# Patient Record
Sex: FEMALE | Race: OTHER | NOT HISPANIC OR LATINO
[De-identification: names, ages, dates, MRNs, and addresses within clinical notes are randomized per-mention and may not be internally consistent; named-entity substitution may affect disease eponyms.]

---

## 2017-07-19 ENCOUNTER — APPOINTMENT (OUTPATIENT)
Dept: MAMMOGRAPHY | Facility: IMAGING CENTER | Age: 42
End: 2017-07-19

## 2017-07-19 ENCOUNTER — OUTPATIENT (OUTPATIENT)
Dept: OUTPATIENT SERVICES | Facility: HOSPITAL | Age: 42
LOS: 1 days | End: 2017-07-19
Payer: COMMERCIAL

## 2017-07-19 ENCOUNTER — APPOINTMENT (OUTPATIENT)
Dept: ULTRASOUND IMAGING | Facility: IMAGING CENTER | Age: 42
End: 2017-07-19

## 2017-07-19 DIAGNOSIS — R22.9 LOCALIZED SWELLING, MASS AND LUMP, UNSPECIFIED: Chronic | ICD-10-CM

## 2017-07-19 DIAGNOSIS — Z00.00 ENCOUNTER FOR GENERAL ADULT MEDICAL EXAMINATION WITHOUT ABNORMAL FINDINGS: ICD-10-CM

## 2017-07-19 PROCEDURE — 76641 ULTRASOUND BREAST COMPLETE: CPT

## 2017-07-19 PROCEDURE — 77067 SCR MAMMO BI INCL CAD: CPT

## 2017-07-19 PROCEDURE — 77063 BREAST TOMOSYNTHESIS BI: CPT

## 2017-07-21 ENCOUNTER — TRANSCRIPTION ENCOUNTER (OUTPATIENT)
Age: 42
End: 2017-07-21

## 2017-08-15 ENCOUNTER — EMERGENCY (EMERGENCY)
Facility: HOSPITAL | Age: 42
LOS: 0 days | Discharge: HOME | End: 2017-08-15
Attending: EMERGENCY MEDICINE
Payer: COMMERCIAL

## 2017-08-15 VITALS
RESPIRATION RATE: 16 BRPM | HEIGHT: 63 IN | SYSTOLIC BLOOD PRESSURE: 114 MMHG | HEART RATE: 68 BPM | WEIGHT: 149.91 LBS | DIASTOLIC BLOOD PRESSURE: 85 MMHG | OXYGEN SATURATION: 96 % | TEMPERATURE: 98 F

## 2017-08-15 DIAGNOSIS — R22.9 LOCALIZED SWELLING, MASS AND LUMP, UNSPECIFIED: Chronic | ICD-10-CM

## 2017-08-15 PROCEDURE — 99283 EMERGENCY DEPT VISIT LOW MDM: CPT | Mod: 25

## 2017-08-15 PROCEDURE — 12001 RPR S/N/AX/GEN/TRNK 2.5CM/<: CPT

## 2017-08-15 RX ORDER — IBUPROFEN 200 MG
600 TABLET ORAL ONCE
Qty: 0 | Refills: 0 | Status: COMPLETED | OUTPATIENT
Start: 2017-08-15 | End: 2017-08-15

## 2017-08-15 RX ORDER — TETANUS TOXOID, REDUCED DIPHTHERIA TOXOID AND ACELLULAR PERTUSSIS VACCINE, ADSORBED 5; 2.5; 8; 8; 2.5 [IU]/.5ML; [IU]/.5ML; UG/.5ML; UG/.5ML; UG/.5ML
0.5 SUSPENSION INTRAMUSCULAR ONCE
Qty: 0 | Refills: 0 | Status: COMPLETED | OUTPATIENT
Start: 2017-08-15 | End: 2017-08-15

## 2017-08-15 RX ORDER — BACITRACIN ZINC 500 UNIT/G
1 OINTMENT IN PACKET (EA) TOPICAL ONCE
Qty: 0 | Refills: 0 | Status: COMPLETED | OUTPATIENT
Start: 2017-08-15 | End: 2017-08-15

## 2017-08-15 RX ADMIN — TETANUS TOXOID, REDUCED DIPHTHERIA TOXOID AND ACELLULAR PERTUSSIS VACCINE, ADSORBED 0.5 MILLILITER(S): 5; 2.5; 8; 8; 2.5 SUSPENSION INTRAMUSCULAR at 08:13

## 2017-08-15 RX ADMIN — Medication 1 APPLICATION(S): at 08:53

## 2017-08-15 RX ADMIN — Medication 600 MILLIGRAM(S): at 08:00

## 2017-08-15 NOTE — ED ADULT NURSE NOTE - PMH
Allergy  seasonal  Allergy  shellfish  Arthropathy  April 2014,  b/l 5th digit of feet  Malformation  "venous malformation of the brain" --- patient states it "is not chiari"  Post concussion syndrome  2011 s/p fall in bathtub-- residual of intermittent headaches. Followed by her pcp after cleared by a neurologist

## 2017-08-15 NOTE — ED PROVIDER NOTE - PROGRESS NOTE DETAILS
Pt. reports feeling better after meds and lac repair; simple lac repair, no fb, no bone visible, no longer bleeding  pt. agrees to f/u with primary care outpt. in 1 wk for suture removal  pt. understands to return to ED if symptoms worsen; will d/c

## 2017-08-15 NOTE — ED PROVIDER NOTE - OBJECTIVE STATEMENT
41 yo F with R thumb laceration while using a kitchen knife this morning.  Cut was accidental.  Pt. has no other complaints and denies other trauma.  Happened about 30 minutes ago.  Still bleeding.  No other complaints.   ROS: negative for fever, cough, headache, chest pain, shortness of breath, abd pain, nausea, vomiting, diarrhea, rash, paresthesia, and weakness.   PMH: 2013  right breast lumpectomy -- benign, venous malformation brain; Meds: Denies; SH: Denies smoking/drinking/drug use

## 2017-08-15 NOTE — ED PROVIDER NOTE - PHYSICAL EXAMINATION
Vitals: WNL  Gen: AAOx3, NAD, sitting comfortably in stretcher  Head: ncat, perrla, eomi b/l  Neck: supple, no lymphadenopathy, no midline deviation  Heart: rrr, no m/r/g  Lungs: CTA b/l, no rales/ronchi/wheezes  Abd: soft, nontender, non-distended, no rebound or guarding  Ext: no clubbing/cyanosis/edema, 2 cm laceration to R volar thumb, small bleeding, no foreign bodies, normal sensation in tip of thumb.  Lac located over dip, about 2 cm from finger tip, no bone visible  Neuro: sensation and muscle strength intact b/l, steady gait

## 2017-08-15 NOTE — ED PROVIDER NOTE - CARE PLAN
Principal Discharge DX:	Laceration of right thumb without foreign body without damage to nail, initial encounter

## 2017-08-16 DIAGNOSIS — Z91.013 ALLERGY TO SEAFOOD: ICD-10-CM
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
## 2017-08-16 DIAGNOSIS — S61.011A LACERATION WITHOUT FOREIGN BODY OF RIGHT THUMB WITHOUT DAMAGE TO NAIL, INITIAL ENCOUNTER: ICD-10-CM
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
## 2017-08-24 ENCOUNTER — OUTPATIENT (OUTPATIENT)
Dept: OUTPATIENT SERVICES | Facility: HOSPITAL | Age: 42
LOS: 1 days | End: 2017-08-24

## 2017-08-24 VITALS
WEIGHT: 160.06 LBS | TEMPERATURE: 98 F | SYSTOLIC BLOOD PRESSURE: 124 MMHG | RESPIRATION RATE: 16 BRPM | HEIGHT: 63 IN | HEART RATE: 68 BPM | DIASTOLIC BLOOD PRESSURE: 82 MMHG

## 2017-08-24 DIAGNOSIS — Z91.013 ALLERGY TO SEAFOOD: ICD-10-CM

## 2017-08-24 DIAGNOSIS — N92.5 OTHER SPECIFIED IRREGULAR MENSTRUATION: ICD-10-CM

## 2017-08-24 DIAGNOSIS — F07.81 POSTCONCUSSIONAL SYNDROME: ICD-10-CM

## 2017-08-24 DIAGNOSIS — R22.9 LOCALIZED SWELLING, MASS AND LUMP, UNSPECIFIED: Chronic | ICD-10-CM

## 2017-08-24 DIAGNOSIS — Z98.890 OTHER SPECIFIED POSTPROCEDURAL STATES: Chronic | ICD-10-CM

## 2017-08-24 DIAGNOSIS — Q89.9 CONGENITAL MALFORMATION, UNSPECIFIED: ICD-10-CM

## 2017-08-24 LAB
HCT VFR BLD CALC: 37.4 % — SIGNIFICANT CHANGE UP (ref 34.5–45)
HGB BLD-MCNC: 11.8 G/DL — SIGNIFICANT CHANGE UP (ref 11.5–15.5)
MCHC RBC-ENTMCNC: 25.7 PG — LOW (ref 27–34)
MCHC RBC-ENTMCNC: 31.6 % — LOW (ref 32–36)
MCV RBC AUTO: 81.5 FL — SIGNIFICANT CHANGE UP (ref 80–100)
NRBC # FLD: 0 — SIGNIFICANT CHANGE UP
PLATELET # BLD AUTO: 317 K/UL — SIGNIFICANT CHANGE UP (ref 150–400)
PMV BLD: 11.8 FL — SIGNIFICANT CHANGE UP (ref 7–13)
RBC # BLD: 4.59 M/UL — SIGNIFICANT CHANGE UP (ref 3.8–5.2)
RBC # FLD: 18.9 % — HIGH (ref 10.3–14.5)
WBC # BLD: 7.87 K/UL — SIGNIFICANT CHANGE UP (ref 3.8–10.5)
WBC # FLD AUTO: 7.87 K/UL — SIGNIFICANT CHANGE UP (ref 3.8–10.5)

## 2017-08-24 RX ORDER — SODIUM CHLORIDE 9 MG/ML
1000 INJECTION, SOLUTION INTRAVENOUS
Qty: 0 | Refills: 0 | Status: DISCONTINUED | OUTPATIENT
Start: 2017-09-06 | End: 2017-09-06

## 2017-08-24 NOTE — H&P PST ADULT - RS GEN PE MLT RESP DETAILS PC
no wheezes/no rhonchi/no rales/clear to auscultation bilaterally good air movement/no wheezes/no intercostal retractions/no rales/respirations non-labored/airway patent/no subcutaneous emphysema/breath sounds equal/no rhonchi/no chest wall tenderness/clear to auscultation bilaterally

## 2017-08-24 NOTE — H&P PST ADULT - HISTORY OF PRESENT ILLNESS
42 year old female G0 presents to presurgical testing with diagnosis of other specified irregular menstruation scheduled for hysteroscopy endometrial ablation for 9/6/17. Pt reports heavy menstrual bleeding for years, lasting for about 3 days, associated with iron deficiency anemia. Denies pelvic pain but has cramping with menses. Sonogram done and referred for surgical intervention.

## 2017-08-24 NOTE — H&P PST ADULT - NEGATIVE ENMT SYMPTOMS
no nasal discharge/no dysphagia/no ear pain/no nasal obstruction/no tinnitus/no sinus symptoms/no vertigo/no throat pain/no post-nasal discharge/no hearing difficulty

## 2017-08-24 NOTE — H&P PST ADULT - PROBLEM SELECTOR PLAN 1
Pt is scheduled for hysteroscopy endometrial ablation for 9/6/17. Preop instructions and Pepcid provided. Pt stated understanding.

## 2017-08-24 NOTE — H&P PST ADULT - PSH
H/O arthroplasty  b/l fifth digit of feet 4/2014  H/O nasal septoplasty  2015  Mass  2013  right breast lumpectomy -- benign

## 2017-08-24 NOTE — H&P PST ADULT - NEGATIVE OPHTHALMOLOGIC SYMPTOMS
wears glasses or contacts/no diplopia/no pain L/no loss of vision R/no discharge L/no loss of vision L/no blurred vision L/no blurred vision R/no discharge R/no photophobia

## 2017-08-24 NOTE — H&P PST ADULT - NEUROLOGICAL COMMENTS
2011 fell in bath tub with subsequent concussion-- 2012 diagnosed with post concussion syndrome with intermittent headaches, managed on Wellbutrin

## 2017-08-24 NOTE — H&P PST ADULT - LYMPHATIC
supraclavicular R/posterior cervical R/posterior cervical L/supraclavicular L/anterior cervical R/anterior cervical L

## 2017-08-24 NOTE — H&P PST ADULT - VISION (WITH CORRECTIVE LENSES IF THE PATIENT USUALLY WEARS THEM):
Normal vision: sees adequately in most situations; can see medication labels, newsprint/glasses / contacts

## 2017-08-24 NOTE — H&P PST ADULT - [facility] PE MLT D E PC
regular rate and rhythm/S 1  S 2/no murmur/no rub no rub/no murmur/regular rate and rhythm/normal PMI

## 2017-08-24 NOTE — H&P PST ADULT - NSANTHOSAYNRD_GEN_A_CORE
No. GLENN screening performed.  STOP BANG Legend: 0-2 = LOW Risk; 3-4 = INTERMEDIATE Risk; 5-8 = HIGH Risk

## 2017-08-24 NOTE — H&P PST ADULT - PMH
Allergy  shellfish  Allergy  seasonal  Arthropathy  April 2014,  b/l 5th digit of feet  CAMRON (iron deficiency anemia)    Malformation  "venous malformation of the brain" --- patient states it "is not chiari"  Other specified irregular menstruation    Post concussion syndrome  2011 s/p fall in bathtub-- residual of intermittent headaches. Followed by her pcp after cleared by a neurologist

## 2017-08-24 NOTE — H&P PST ADULT - NEGATIVE GENERAL GENITOURINARY SYMPTOMS
no dysuria/no urinary hesitancy/no flank pain R/no flank pain L/no incontinence/no hematuria/normal urinary frequency/no bladder infections/no renal colic

## 2017-08-24 NOTE — H&P PST ADULT - NEGATIVE CARDIOVASCULAR SYMPTOMS
no orthopnea/no dyspnea on exertion/no peripheral edema/no palpitations/no claudication/no chest pain/no paroxysmal nocturnal dyspnea

## 2017-08-24 NOTE — H&P PST ADULT - FALLEN IN THE PAST
ENT was paged [place]: per ent, admit for iv abx, hospitalist accepted admission, requesting cxr no

## 2017-08-24 NOTE — H&P PST ADULT - MUSCULOSKELETAL
details… detailed exam normal strength/no joint warmth/no calf tenderness/no joint swelling/ROM intact/no joint erythema

## 2017-08-24 NOTE — H&P PST ADULT - NEGATIVE NEUROLOGICAL SYMPTOMS
no transient paralysis/no difficulty walking/no loss of sensation/no loss of consciousness/no weakness/no syncope/no focal seizures/no facial palsy/no paresthesias/no generalized seizures/no confusion/no vertigo/no tremors

## 2017-08-24 NOTE — H&P PST ADULT - NEGATIVE GENERAL SYMPTOMS
no polydipsia/no weight loss/no chills/no fever/no weight gain/no sweating/no malaise/no anorexia/no polyphagia/no polyuria/no fatigue

## 2017-08-24 NOTE — H&P PST ADULT - GASTROINTESTINAL DETAILS
no masses palpable/soft/nontender/bowel sounds normal/no distention no rebound tenderness/no masses palpable/nontender/no guarding/no bruit/soft/no organomegaly/bowel sounds normal/no distention/no rigidity

## 2017-08-24 NOTE — H&P PST ADULT - NEGATIVE MUSCULOSKELETAL SYMPTOMS
no stiffness/no joint swelling/no arthritis/no muscle weakness/no back pain/no arthralgia/no muscle cramps/no neck pain

## 2017-09-05 NOTE — ASU PATIENT PROFILE, ADULT - VISION (WITH CORRECTIVE LENSES IF THE PATIENT USUALLY WEARS THEM):
glasses / contacts/Partially impaired: cannot see medication labels or newsprint, but can see obstacles in path, and the surrounding layout; can count fingers at arm's length

## 2017-09-06 ENCOUNTER — RESULT REVIEW (OUTPATIENT)
Age: 42
End: 2017-09-06

## 2017-09-06 ENCOUNTER — TRANSCRIPTION ENCOUNTER (OUTPATIENT)
Age: 42
End: 2017-09-06

## 2017-09-06 ENCOUNTER — OUTPATIENT (OUTPATIENT)
Dept: OUTPATIENT SERVICES | Facility: HOSPITAL | Age: 42
LOS: 1 days | Discharge: HOME | End: 2017-09-06
Payer: COMMERCIAL

## 2017-09-06 VITALS
SYSTOLIC BLOOD PRESSURE: 113 MMHG | OXYGEN SATURATION: 96 % | TEMPERATURE: 98 F | RESPIRATION RATE: 18 BRPM | DIASTOLIC BLOOD PRESSURE: 80 MMHG | HEART RATE: 62 BPM

## 2017-09-06 VITALS
SYSTOLIC BLOOD PRESSURE: 128 MMHG | OXYGEN SATURATION: 98 % | HEART RATE: 60 BPM | TEMPERATURE: 98 F | DIASTOLIC BLOOD PRESSURE: 84 MMHG | WEIGHT: 160.06 LBS | RESPIRATION RATE: 16 BRPM | HEIGHT: 63 IN

## 2017-09-06 DIAGNOSIS — Z98.890 OTHER SPECIFIED POSTPROCEDURAL STATES: Chronic | ICD-10-CM

## 2017-09-06 DIAGNOSIS — R22.9 LOCALIZED SWELLING, MASS AND LUMP, UNSPECIFIED: Chronic | ICD-10-CM

## 2017-09-06 DIAGNOSIS — N92.5 OTHER SPECIFIED IRREGULAR MENSTRUATION: ICD-10-CM

## 2017-09-06 PROCEDURE — 88305 TISSUE EXAM BY PATHOLOGIST: CPT | Mod: 26

## 2017-09-06 RX ORDER — BUPROPION HYDROCHLORIDE 150 MG/1
1 TABLET, EXTENDED RELEASE ORAL
Qty: 0 | Refills: 0 | COMMUNITY

## 2017-09-06 RX ADMIN — SODIUM CHLORIDE 30 MILLILITER(S): 9 INJECTION, SOLUTION INTRAVENOUS at 08:33

## 2017-09-06 RX ADMIN — SODIUM CHLORIDE 30 MILLILITER(S): 9 INJECTION, SOLUTION INTRAVENOUS at 07:23

## 2017-09-06 NOTE — ASU DISCHARGE PLAN (ADULT/PEDIATRIC). - NURSING INSTRUCTIONS
You received intravenous tylenol in the operating room at 8am. You may take additional tylenol products after 2pm. You also received intravenous toradol (NSAID) in the operating room at 8am. You may take additional NSAIDs (advil/aleve/ibuprofen/motrin) after 2pm as well.

## 2017-09-06 NOTE — ASU DISCHARGE PLAN (ADULT/PEDIATRIC). - ACTIVITY LEVEL
x 2 weeks/nothing per vagina no douching/no tampons/no intercourse/no heavy lifting/x 2 weeks/no tub baths/nothing per vagina

## 2017-09-06 NOTE — ASU DISCHARGE PLAN (ADULT/PEDIATRIC). - NOTIFY
Bleeding that does not stop GYN Fever>100.4/Bleeding that does not stop/Unable to Urinate/Pain not relieved by Medications

## 2017-09-11 LAB — SURGICAL PATHOLOGY STUDY: SIGNIFICANT CHANGE UP

## 2018-08-14 PROBLEM — D50.9 IRON DEFICIENCY ANEMIA, UNSPECIFIED: Chronic | Status: ACTIVE | Noted: 2017-08-24

## 2018-08-14 PROBLEM — N92.5 OTHER SPECIFIED IRREGULAR MENSTRUATION: Chronic | Status: ACTIVE | Noted: 2017-08-24

## 2018-08-17 ENCOUNTER — OUTPATIENT (OUTPATIENT)
Dept: OUTPATIENT SERVICES | Facility: HOSPITAL | Age: 43
LOS: 1 days | End: 2018-08-17
Payer: COMMERCIAL

## 2018-08-17 ENCOUNTER — APPOINTMENT (OUTPATIENT)
Dept: MAMMOGRAPHY | Facility: IMAGING CENTER | Age: 43
End: 2018-08-17

## 2018-08-17 ENCOUNTER — APPOINTMENT (OUTPATIENT)
Dept: ULTRASOUND IMAGING | Facility: IMAGING CENTER | Age: 43
End: 2018-08-17
Payer: COMMERCIAL

## 2018-08-17 DIAGNOSIS — Z00.8 ENCOUNTER FOR OTHER GENERAL EXAMINATION: ICD-10-CM

## 2018-08-17 DIAGNOSIS — Z98.890 OTHER SPECIFIED POSTPROCEDURAL STATES: Chronic | ICD-10-CM

## 2018-08-17 DIAGNOSIS — R22.9 LOCALIZED SWELLING, MASS AND LUMP, UNSPECIFIED: Chronic | ICD-10-CM

## 2018-08-17 PROCEDURE — G0279: CPT | Mod: 26

## 2018-08-17 PROCEDURE — 77066 DX MAMMO INCL CAD BI: CPT

## 2018-08-17 PROCEDURE — 76641 ULTRASOUND BREAST COMPLETE: CPT | Mod: 26,50

## 2018-08-17 PROCEDURE — 77066 DX MAMMO INCL CAD BI: CPT | Mod: 26

## 2018-08-17 PROCEDURE — 76641 ULTRASOUND BREAST COMPLETE: CPT

## 2018-08-17 PROCEDURE — G0279: CPT

## 2018-08-29 ENCOUNTER — APPOINTMENT (OUTPATIENT)
Dept: SURGERY | Facility: CLINIC | Age: 43
End: 2018-08-29

## 2019-03-18 ENCOUNTER — TRANSCRIPTION ENCOUNTER (OUTPATIENT)
Age: 44
End: 2019-03-18

## 2019-06-27 ENCOUNTER — EMERGENCY (EMERGENCY)
Facility: HOSPITAL | Age: 44
LOS: 0 days | Discharge: HOME | End: 2019-06-28
Attending: EMERGENCY MEDICINE
Payer: COMMERCIAL

## 2019-06-27 VITALS
HEART RATE: 81 BPM | OXYGEN SATURATION: 100 % | DIASTOLIC BLOOD PRESSURE: 86 MMHG | SYSTOLIC BLOOD PRESSURE: 117 MMHG | TEMPERATURE: 98 F | HEIGHT: 63 IN | WEIGHT: 169.98 LBS | RESPIRATION RATE: 18 BRPM

## 2019-06-27 DIAGNOSIS — S93.401A SPRAIN OF UNSPECIFIED LIGAMENT OF RIGHT ANKLE, INITIAL ENCOUNTER: ICD-10-CM

## 2019-06-27 DIAGNOSIS — D50.9 IRON DEFICIENCY ANEMIA, UNSPECIFIED: ICD-10-CM

## 2019-06-27 DIAGNOSIS — R22.9 LOCALIZED SWELLING, MASS AND LUMP, UNSPECIFIED: Chronic | ICD-10-CM

## 2019-06-27 DIAGNOSIS — M25.471 EFFUSION, RIGHT ANKLE: ICD-10-CM
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
## 2019-06-27 DIAGNOSIS — Z91.013 ALLERGY TO SEAFOOD: ICD-10-CM

## 2019-06-27 DIAGNOSIS — Z98.890 OTHER SPECIFIED POSTPROCEDURAL STATES: Chronic | ICD-10-CM

## 2019-06-27 DIAGNOSIS — M25.571 PAIN IN RIGHT ANKLE AND JOINTS OF RIGHT FOOT: ICD-10-CM

## 2019-06-27 PROCEDURE — 99283 EMERGENCY DEPT VISIT LOW MDM: CPT

## 2019-06-27 NOTE — ED ADULT NURSE NOTE - PMH
Allergy  seasonal  Allergy  shellfish  Arthropathy  April 2014,  b/l 5th digit of feet  CAMRON (iron deficiency anemia)    Malformation  "venous malformation of the brain" --- patient states it "is not chiari"  Other specified irregular menstruation    Post concussion syndrome  2011 s/p fall in bathtub-- residual of intermittent headaches. Followed by her pcp after cleared by a neurologist

## 2019-06-27 NOTE — ED ADULT NURSE NOTE - OBJECTIVE STATEMENT
pt received to spot 30 c/o pain in right ankle after falling down a few steps. pt states "my foot rolled, after I missed a few steps. I felt dizzy and lightheaded afterwards." denies: headache, chest pain, SOB, abdominal, pain, hitting head, LOC. family at bedside. ice pack applied to pt's ankle.

## 2019-06-28 VITALS
OXYGEN SATURATION: 99 % | SYSTOLIC BLOOD PRESSURE: 120 MMHG | HEART RATE: 65 BPM | TEMPERATURE: 98 F | RESPIRATION RATE: 18 BRPM | DIASTOLIC BLOOD PRESSURE: 81 MMHG

## 2019-06-28 PROCEDURE — 73610 X-RAY EXAM OF ANKLE: CPT | Mod: 26,RT

## 2019-06-28 PROCEDURE — 73590 X-RAY EXAM OF LOWER LEG: CPT | Mod: 26,RT

## 2019-06-28 RX ORDER — IBUPROFEN 200 MG
1 TABLET ORAL
Qty: 28 | Refills: 0
Start: 2019-06-28 | End: 2019-07-04

## 2019-06-28 RX ORDER — OXYCODONE AND ACETAMINOPHEN 5; 325 MG/1; MG/1
1 TABLET ORAL ONCE
Refills: 0 | Status: DISCONTINUED | OUTPATIENT
Start: 2019-06-28 | End: 2019-06-28

## 2019-06-28 RX ADMIN — OXYCODONE AND ACETAMINOPHEN 1 TABLET(S): 5; 325 TABLET ORAL at 02:42

## 2019-06-28 RX ADMIN — OXYCODONE AND ACETAMINOPHEN 1 TABLET(S): 5; 325 TABLET ORAL at 01:42

## 2019-06-28 RX ADMIN — OXYCODONE AND ACETAMINOPHEN 1 TABLET(S): 5; 325 TABLET ORAL at 04:37

## 2019-06-28 NOTE — ED PROVIDER NOTE - CLINICAL SUMMARY MEDICAL DECISION MAKING FREE TEXT BOX
Pt s/p ankle injury, XR neg for fx, on re-eval and discussion w pt, she reports she is unable to straighten ankle, holding it slightly inverted.  Consulted ortho, pt able to move ankle w FROM.  Instructed on care, given crutches, instructed on use.  Pt given Rx and instructed/cautioned on use.  Dc home to fu w PMD & ortho, instructed to return to ED if sx worsen.

## 2019-06-28 NOTE — CONSULT NOTE ADULT - ASSESSMENT
Pt is a 44y Female with a R Ankle Sprain.  -Closed Fracture, NV Intact  -Pain control  -XR R Ankle: Negative for fracture or deformity.  -Pt placed in compressive ace wrap.  -Recommend CAM Boot for walking when tolerating weight-bearing  -Rest, ice, elevate affected ankle  -WBAT on affected ankle with assistive devices as needed  -Please follow up in office within 5-7 days of discharge from ED with Dr. Marsh. Call office for appointment.  -Ortho stable for discharge.    Iraida Her M.D.  PGY-1 Orthopaedic Surgery

## 2019-06-28 NOTE — ED PROVIDER NOTE - PHYSICAL EXAMINATION
Gen: Alert, c/o pain  Head: NC, AT, EOMI, normal lids/conjunctiva  ENT: normal hearing, patent oropharynx, MMM  Neck: supple, no tenderness/meningismus, FROM, Trachea midline  Pulm: Bilateral clear BS, normal resp effort, no wheeze/stridor/retractions  CV: RRR, no M/R/G, +dist pulses  Abd: soft, NT/ND, +BS, no guarding/rebound tenderness  Mskel: +swelling of R foot & ankle, DP+2, sensation intact, no TTP over proximal fibula, cap refill <2sec, +pain w ROM of ankle  Skin: no rash  Neuro: no sensory/motor deficits

## 2019-06-28 NOTE — ED PROVIDER NOTE - OBJECTIVE STATEMENT
Pertinent PMH/PSH/FHx/SHx and Review of Systems contained within:    43yo F w PMH of anemia presents to ED for eval of R ankle pain & swelling s/p injury.  Pt states she was walking on steps when she accidentally inverted her R foot & fell.  Denies head trauma, LOC.  Pt states she has pain in her R ankle, no improvement w ibuprofen taken at home.  Denies loss of sensation.    No fever/chills, No photophobia/eye pain/changes in vision, No ear pain/sore throat/dysphagia, No chest pain/palpitations, no SOB/cough/wheeze/stridor, No abdominal pain, No neck/back pain, no rash, no changes in neurological status/function.

## 2019-06-28 NOTE — CONSULT NOTE ADULT - SUBJECTIVE AND OBJECTIVE BOX
Orthopaedic Surgery Consult Note    Pt is a 44y Female presenting with R ankle pain s/p mechanical fall while carrying laundry up stairs. Pt is a community ambulatory at baseline. Pt has been unable to bear weight on affected extremity since time of injury. Pt denies numbness, tingling, or paresthesias in affected limb. Pt denies headstrike or LOC and denies any other orthopaedic injuries at this time. Pt denies taking blood thinners _______ . Pt last ate at _______ . Denies fevers, dizziness, CP, SOB, N/V, calf pain.    PMHx:  RIGHT ANKLE  No pertinent family history in first degree relatives  MEWS Score  Other specified irregular menstruation  CAMRON (iron deficiency anemia)  Arthropathy  Allergy  Allergy  Malformation  Post concussion syndrome  H/O nasal septoplasty  H/O arthroplasty  Mass  No Past Surgical History  RIGHT ANKLE  90+    PSHx:    Allergies:  No Known Drug Allergies  shellfish (Swelling; Hives; Vomiting)    Medications:                                  Social: Denies tobacco, EtOH, or illicit drug use.    Imaging:    Labs:              Vitals:  T(C): 36.5 (06-28-19 @ 01:35), Max: 36.6 (06-27-19 @ 23:29)  HR: 75 (06-28-19 @ 01:35) (75 - 81)  BP: 130/73 (06-28-19 @ 01:35) (117/86 - 130/73)  RR: 18 (06-27-19 @ 23:29) (18 - 18)  SpO2: 100% (06-27-19 @ 23:29) (100% - 100%)    Physical Exam:  Gen: NAD, AAOx3    _______ LE:   Skin intact  +edema, erythema, ecchymosis at ankle  +TTP over medial and lateral malleoli   Gross Bony Deformity of Ankle  No bony TTP of Hip/Knee/Foot/Toes  NT with AROM/PROM of Hip/Knee/Toes  Able to SLR  Negative logroll  +EHL/FHL/TA/GS  SILT L2-S1  +DP/PT Pulses  Compartments soft and compressible  No calf TTP B/L    Secondary Assessment:  NC/AT, NTTP of clavicles, NTTP of C-,T-,L-Spine, NTTP of Pelvis  UEs: NTTP of Shoulders, Elbows, Wrists, Hands; NT with AROM/PROM of Shoulders, Elbows, Wrists, Hands; AIN/PIN/Med/Uln/Msc/Rad/Ax intact  _____ LE: Able to SLR, NT with Log Roll, NT with Heel Strike, NTTP of Hip, Knee, Ankle, Foot; NT with AROM/PROM of Hip, Knee, Ankle, Foot; Q/H/Gsc/TA/EHL/FHL intact    Procedure: Procedure explained in detail to patient at bedside. Pt expressed understanding and all questions were answered. Consent for procedure was verbally obtained. Under sterile technique, 10cc 1% lidocaine were injected into the fracture site as a hematoma block. Adequate anesthesia obtained with hematoma block. Closed reduction of the fracture was performed and patient was placed into a well-padded tri-varela splint. Pt NV intact following splint placement and post reduction XRs demonstrated adequate reduction. Orthopaedic Surgery Consult Note    Pt is a 44y Female presenting with R ankle pain s/p mechanical fall while carrying laundry up stairs. Pt is a community ambulatory at baseline. Pt has been unable to bear weight on affected extremity since time of injury. Pt denies numbness, tingling, or paresthesias in affected limb. Pt denies headstrike or LOC and denies any other orthopaedic injuries at this time. Pt denies taking blood thinners. Denies fevers, dizziness, CP, SOB, N/V, calf pain.    PMHx:  CAMRON (iron deficiency anemia)  Arthropathy  Post concussion syndrome    PSHx:  H/O nasal septoplasty  H/O arthroplasty    Allergies:  No Known Drug Allergies  shellfish (Swelling; Hives; Vomiting)                               Social: Denies tobacco, EtOH, or illicit drug use.    Imaging:  XR R Ankle: Negative for fracture or deformity  XR R Tib Fib: Negative for fracture or deformity    Vitals:  T(C): 36.5 (06-28-19 @ 01:35), Max: 36.6 (06-27-19 @ 23:29)  HR: 75 (06-28-19 @ 01:35) (75 - 81)  BP: 130/73 (06-28-19 @ 01:35) (117/86 - 130/73)  RR: 18 (06-27-19 @ 23:29) (18 - 18)  SpO2: 100% (06-27-19 @ 23:29) (100% - 100%)    Physical Exam:  Gen: NAD, AAOx3    RLE:   Skin intact  +edema, ecchymosis at ankle  Diffusely tender over ankle    No gross Bony Deformity of Ankle  No bony TTP of Hip/Knee/Foot/Toes  NT with AROM/PROM of Hip/Knee/Toes  Able to Passively/Actively Dorsiflex/Plantarflex/Vinny/Invert foot with some pain  Able to SLR  Negative logroll  +EHL/FHL/TA/GS  SILT L2-S1  +DP/PT Pulses  Compartments soft and compressible  No calf TTP B/L    Secondary Assessment:  NC/AT, NTTP of clavicles, NTTP of C-,T-,L-Spine, NTTP of Pelvis  UEs: NTTP of Shoulders, Elbows, Wrists, Hands; NT with AROM/PROM of Shoulders, Elbows, Wrists, Hands; AIN/PIN/Med/Uln/Msc/Rad/Ax intact  LLE: Able to SLR, NT with Log Roll, NT with Heel Strike, NTTP of Hip, Knee, Ankle, Foot; NT with AROM/PROM of Hip, Knee, Ankle, Foot; Q/H/Gsc/TA/EHL/FHL intact    Procedure:   Pt placed in compressive ACE Wrap.

## 2020-03-26 ENCOUNTER — RESULT REVIEW (OUTPATIENT)
Age: 45
End: 2020-03-26

## 2021-04-15 NOTE — H&P PST ADULT - NS PRO FEM REPRO PAP RESULTS
Outreach attempt was made to schedule an Annual Wellness Visit. This was the first attempt. Contact was not made, left message.   normal

## 2021-04-21 ENCOUNTER — OUTPATIENT (OUTPATIENT)
Dept: OUTPATIENT SERVICES | Facility: HOSPITAL | Age: 46
LOS: 1 days | End: 2021-04-21
Payer: COMMERCIAL

## 2021-04-21 ENCOUNTER — APPOINTMENT (OUTPATIENT)
Dept: ULTRASOUND IMAGING | Facility: IMAGING CENTER | Age: 46
End: 2021-04-21

## 2021-04-21 ENCOUNTER — APPOINTMENT (OUTPATIENT)
Dept: MAMMOGRAPHY | Facility: IMAGING CENTER | Age: 46
End: 2021-04-21

## 2021-04-21 DIAGNOSIS — R22.9 LOCALIZED SWELLING, MASS AND LUMP, UNSPECIFIED: Chronic | ICD-10-CM

## 2021-04-21 DIAGNOSIS — Z98.890 OTHER SPECIFIED POSTPROCEDURAL STATES: Chronic | ICD-10-CM

## 2021-04-21 DIAGNOSIS — Z00.8 ENCOUNTER FOR OTHER GENERAL EXAMINATION: ICD-10-CM

## 2021-04-21 PROCEDURE — 77067 SCR MAMMO BI INCL CAD: CPT | Mod: 26

## 2021-04-21 PROCEDURE — 77063 BREAST TOMOSYNTHESIS BI: CPT

## 2021-04-21 PROCEDURE — 77063 BREAST TOMOSYNTHESIS BI: CPT | Mod: 26

## 2021-04-21 PROCEDURE — 77067 SCR MAMMO BI INCL CAD: CPT

## 2021-05-23 ENCOUNTER — RESULT REVIEW (OUTPATIENT)
Age: 46
End: 2021-05-23

## 2021-11-22 ENCOUNTER — APPOINTMENT (OUTPATIENT)
Dept: GASTROENTEROLOGY | Facility: CLINIC | Age: 46
End: 2021-11-22
Payer: COMMERCIAL

## 2021-11-22 VITALS
DIASTOLIC BLOOD PRESSURE: 80 MMHG | HEART RATE: 100 BPM | SYSTOLIC BLOOD PRESSURE: 125 MMHG | BODY MASS INDEX: 28.35 KG/M2 | TEMPERATURE: 98 F | HEIGHT: 63 IN | OXYGEN SATURATION: 98 % | WEIGHT: 160 LBS

## 2021-11-22 DIAGNOSIS — Z12.11 ENCOUNTER FOR SCREENING FOR MALIGNANT NEOPLASM OF COLON: ICD-10-CM

## 2021-11-22 DIAGNOSIS — Z78.9 OTHER SPECIFIED HEALTH STATUS: ICD-10-CM

## 2021-11-22 DIAGNOSIS — K59.09 OTHER CONSTIPATION: ICD-10-CM

## 2021-11-22 DIAGNOSIS — E66.3 OVERWEIGHT: ICD-10-CM

## 2021-11-22 PROCEDURE — 99242 OFF/OP CONSLTJ NEW/EST SF 20: CPT

## 2021-11-22 RX ORDER — SODIUM PICOSULFATE, MAGNESIUM OXIDE, AND ANHYDROUS CITRIC ACID 10; 3.5; 12 MG/160ML; G/160ML; G/160ML
10-3.5-12 MG-GM LIQUID ORAL
Qty: 320 | Refills: 0 | Status: ACTIVE | COMMUNITY
Start: 2021-11-22 | End: 1900-01-01

## 2021-11-22 NOTE — ASSESSMENT
[FreeTextEntry1] : Impression:\par \par #1 colon cancer screening–rule out colonic neoplasm.  Patient is currently asymptomatic from a GI standpoint except for mild stable chronic constipation without change in bowel habit.  Patient average risk.\par \par #2 chronic constipation–longstanding without change in bowel habit.  Consistent with normal transit constipation of functional etiology.\par \par #3 overweight–BMI 28.34.\par \par #4 status post septoplasty.

## 2021-11-22 NOTE — CONSULT LETTER
[Dear  ___] : Dear ~CHUCK, [Consult Letter:] : I had the pleasure of evaluating your patient, [unfilled]. [( Thank you for referring [unfilled] for consultation for _____ )] : Thank you for referring [unfilled] for consultation for [unfilled] [Please see my note below.] : Please see my note below. [Consult Closing:] : Thank you very much for allowing me to participate in the care of this patient.  If you have any questions, please do not hesitate to contact me. [Sincerely,] : Sincerely, [FreeTextEntry2] : Sweta Farfan, NP [FreeTextEntry3] : Artem Nolan MD

## 2021-11-22 NOTE — HISTORY OF PRESENT ILLNESS
[FreeTextEntry1] : Office consultation on 11/22/2021.\par \par The patient is a 46-year-old woman evaluated for consultation in preparation for a screening colonoscopy.  PCP: Sweta Farfan NP.\par \par The patient has mild stable chronic constipation.  Typically has 1 somewhat firm [place] 2 bowel movement daily associated with straining.  However, denies any recent change in bowel habit, rectal bleeding or diarrhea.  Indicates poor response to fiber supplement exacerbating her symptoms.  Currently utilizing MiraLAX as needed with good results.\par \par Appetite and weight stable.  Reports no complaints of dysphagia, nausea or vomiting.  Can have occasional heartburn after trigger foods such as acid-containing foods.  Denies abdominal pain.\par \par Patient reports no past history of digestive illness.  The patient has never had a colonoscopy.  Family history of colon cancer is not reported.

## 2021-11-22 NOTE — PHYSICAL EXAM
[General Appearance - Alert] : alert [General Appearance - In No Acute Distress] : in no acute distress [General Appearance - Well Nourished] : well nourished [General Appearance - Well Developed] : well developed [General Appearance - Well-Appearing] : healthy appearing [Sclera] : the sclera and conjunctiva were normal [Neck Appearance] : the appearance of the neck was normal [Neck Cervical Mass (___cm)] : no neck mass was observed [Respiration, Rhythm And Depth] : normal respiratory rhythm and effort [Exaggerated Use Of Accessory Muscles For Inspiration] : no accessory muscle use [Auscultation Breath Sounds / Voice Sounds] : lungs were clear to auscultation bilaterally [Heart Rate And Rhythm] : heart rate was normal and rhythm regular [Heart Sounds] : normal S1 and S2 [Heart Sounds Gallop] : no gallops [Murmurs] : no murmurs [Heart Sounds Pericardial Friction Rub] : no pericardial rub [Edema] : there was no peripheral edema [Abdomen Soft] : soft [Abdomen Tenderness] : non-tender [] : no hepato-splenomegaly [Abdomen Mass (___ Cm)] : no abdominal mass palpated [Abdomen Hernia] : no hernia was discovered [Abnormal Walk] : normal gait [Nail Clubbing] : no clubbing  or cyanosis of the fingernails [Skin Color & Pigmentation] : normal skin color and pigmentation [Oriented To Time, Place, And Person] : oriented to person, place, and time [Impaired Insight] : insight and judgment were intact [Affect] : the affect was normal [Mood] : the mood was normal

## 2022-01-01 NOTE — ED ADULT TRIAGE NOTE - AS HEIGHT TYPE
No signs of meconium exposure/Normal patterns of skin texture/Normal patterns of skin integrity/Normal patterns of skin pigmentation/Normal patterns of skin color/Normal patterns of skin vascularity/Normal patterns of skin perfusion/No rashes/No eruptions stated

## 2022-03-17 NOTE — ASU PATIENT PROFILE, ADULT - NSICDXPASTSURGICALHX_GEN_ALL_CORE_FT
PAST SURGICAL HISTORY:  H/O arthroplasty b/l fifth digit of feet 4/2014    H/O nasal septoplasty 2015    Mass 2013  right breast lumpectomy -- benign    No Past Surgical History

## 2022-03-17 NOTE — ASU PATIENT PROFILE, ADULT - ALCOHOL USE HISTORY SINGLE SELECT
Body Location Override (Optional - Billing Will Still Be Based On Selected Body Map Location If Applicable): right cheek
Detail Level: Detailed
Add 87044 Cpt? (Important Note: In 2017 The Use Of 43440 Is Being Tracked By Cms To Determine Future Global Period Reimbursement For Global Periods): no
Body Location Override (Optional - Billing Will Still Be Based On Selected Body Map Location If Applicable): left foot
never

## 2022-03-17 NOTE — ASU PATIENT PROFILE, ADULT - FALL HARM RISK - UNIVERSAL INTERVENTIONS
Bed in lowest position, wheels locked, appropriate side rails in place/Call bell, personal items and telephone in reach/Instruct patient to call for assistance before getting out of bed or chair/Non-slip footwear when patient is out of bed/[place] to call system/Physically safe environment - no spills, clutter or unnecessary equipment/Purposeful Proactive Rounding/Room/bathroom lighting operational, light cord in reach

## 2022-03-17 NOTE — ASU PATIENT PROFILE, ADULT - NSICDXPASTMEDICALHX_GEN_ALL_CORE_FT
PAST MEDICAL HISTORY:  Allergy shellfish    Allergy seasonal    Arthropathy April 2014,  b/l 5th digit of feet    CAMRON (iron deficiency anemia)     Malformation "venous malformation of the brain" --- patient states it "is not chiari"    Other specified irregular menstruation     Post concussion syndrome 2011 s/p fall in bathtub-- residual of intermittent headaches. Followed by her pcp after cleared by a neurologist

## 2022-03-18 ENCOUNTER — OUTPATIENT (OUTPATIENT)
Dept: OUTPATIENT SERVICES | Facility: HOSPITAL | Age: 47
LOS: 1 days | Discharge: HOME | End: 2022-03-18
Payer: COMMERCIAL

## 2022-03-18 ENCOUNTER — APPOINTMENT (OUTPATIENT)
Dept: GASTROENTEROLOGY | Facility: HOSPITAL | Age: 47
End: 2022-03-18

## 2022-03-18 ENCOUNTER — NON-APPOINTMENT (OUTPATIENT)
Age: 47
End: 2022-03-18

## 2022-03-18 VITALS
HEART RATE: 81 BPM | OXYGEN SATURATION: 100 % | DIASTOLIC BLOOD PRESSURE: 80 MMHG | SYSTOLIC BLOOD PRESSURE: 130 MMHG | RESPIRATION RATE: 19 BRPM

## 2022-03-18 VITALS
DIASTOLIC BLOOD PRESSURE: 89 MMHG | HEIGHT: 63 IN | HEART RATE: 90 BPM | WEIGHT: 166.01 LBS | OXYGEN SATURATION: 100 % | TEMPERATURE: 97 F | SYSTOLIC BLOOD PRESSURE: 143 MMHG | RESPIRATION RATE: 22 BRPM

## 2022-03-18 DIAGNOSIS — Z98.890 OTHER SPECIFIED POSTPROCEDURAL STATES: Chronic | ICD-10-CM

## 2022-03-18 DIAGNOSIS — Z12.11 ENCOUNTER FOR SCREENING FOR MALIGNANT NEOPLASM OF COLON: ICD-10-CM

## 2022-03-18 DIAGNOSIS — R22.9 LOCALIZED SWELLING, MASS AND LUMP, UNSPECIFIED: Chronic | ICD-10-CM

## 2022-03-18 PROCEDURE — 45378 DIAGNOSTIC COLONOSCOPY: CPT

## 2022-03-18 NOTE — ASU PREOP CHECKLIST - VERIFY SURGICAL SITE/SIDE WITH PATIENT
Fax request received from [facility] requesting signature for medication list.  \"The patient is residing at the [address] [place] [address].   Our pharmacy provides multi-dosing packaging for this [facility]isted [facility] Commu done

## 2022-08-09 ENCOUNTER — RESULT REVIEW (OUTPATIENT)
Age: 47
End: 2022-08-09

## 2022-09-30 ENCOUNTER — APPOINTMENT (OUTPATIENT)
Dept: MAMMOGRAPHY | Facility: IMAGING CENTER | Age: 47
End: 2022-09-30

## 2022-09-30 ENCOUNTER — OUTPATIENT (OUTPATIENT)
Dept: OUTPATIENT SERVICES | Facility: HOSPITAL | Age: 47
LOS: 1 days | End: 2022-09-30
Payer: COMMERCIAL

## 2022-09-30 ENCOUNTER — APPOINTMENT (OUTPATIENT)
Dept: ULTRASOUND IMAGING | Facility: IMAGING CENTER | Age: 47
End: 2022-09-30

## 2022-09-30 DIAGNOSIS — Z98.890 OTHER SPECIFIED POSTPROCEDURAL STATES: Chronic | ICD-10-CM

## 2022-09-30 DIAGNOSIS — Z00.8 ENCOUNTER FOR OTHER GENERAL EXAMINATION: ICD-10-CM

## 2022-09-30 DIAGNOSIS — R22.9 LOCALIZED SWELLING, MASS AND LUMP, UNSPECIFIED: Chronic | ICD-10-CM

## 2022-09-30 PROCEDURE — 77063 BREAST TOMOSYNTHESIS BI: CPT

## 2022-09-30 PROCEDURE — 77063 BREAST TOMOSYNTHESIS BI: CPT | Mod: 26

## 2022-09-30 PROCEDURE — 77067 SCR MAMMO BI INCL CAD: CPT | Mod: 26

## 2022-09-30 PROCEDURE — 77067 SCR MAMMO BI INCL CAD: CPT

## 2022-10-06 ENCOUNTER — OUTPATIENT (OUTPATIENT)
Dept: OUTPATIENT SERVICES | Facility: HOSPITAL | Age: 47
LOS: 1 days | End: 2022-10-06
Payer: COMMERCIAL

## 2022-10-06 ENCOUNTER — APPOINTMENT (OUTPATIENT)
Dept: ULTRASOUND IMAGING | Facility: IMAGING CENTER | Age: 47
End: 2022-10-06

## 2022-10-06 DIAGNOSIS — Z98.890 OTHER SPECIFIED POSTPROCEDURAL STATES: Chronic | ICD-10-CM

## 2022-10-06 DIAGNOSIS — Z00.8 ENCOUNTER FOR OTHER GENERAL EXAMINATION: ICD-10-CM

## 2022-10-06 DIAGNOSIS — R22.9 LOCALIZED SWELLING, MASS AND LUMP, UNSPECIFIED: Chronic | ICD-10-CM

## 2022-10-06 PROCEDURE — 76641 ULTRASOUND BREAST COMPLETE: CPT

## 2022-10-06 PROCEDURE — 76641 ULTRASOUND BREAST COMPLETE: CPT | Mod: 26,50

## 2023-06-27 NOTE — ASU PREOP CHECKLIST - ASSESSMENT, HISTORY & PHYSICAL COMPLETED AND ON MEDICAL RECORD
Keep dressing in place for the first 24 hours  After 24 hours you may remove the dressing but do not disturb the Surgicel dressing as this will fall off on its own as the wound heals  Once this falls off, be sure to clean the finger tip at least once daily or if it gets soiled, and apply a thin layer of antibiotic ointment like Neosporin  Please return or go to the emergency room with any new or worsening symptoms  Avoid exposing the wound to any excess debris.  Try and use a glove when working with dishes or in any outdoor areas until the wound is healed  Motrin, or Aleve as needed for pain control    
done

## 2023-10-02 ENCOUNTER — OUTPATIENT (OUTPATIENT)
Dept: OUTPATIENT SERVICES | Facility: HOSPITAL | Age: 48
LOS: 1 days | End: 2023-10-02
Payer: COMMERCIAL

## 2023-10-02 ENCOUNTER — APPOINTMENT (OUTPATIENT)
Dept: MAMMOGRAPHY | Facility: IMAGING CENTER | Age: 48
End: 2023-10-02
Payer: COMMERCIAL

## 2023-10-02 DIAGNOSIS — R22.9 LOCALIZED SWELLING, MASS AND LUMP, UNSPECIFIED: Chronic | ICD-10-CM

## 2023-10-02 DIAGNOSIS — Z00.8 ENCOUNTER FOR OTHER GENERAL EXAMINATION: ICD-10-CM

## 2023-10-02 DIAGNOSIS — Z98.890 OTHER SPECIFIED POSTPROCEDURAL STATES: Chronic | ICD-10-CM

## 2023-10-02 PROCEDURE — 77063 BREAST TOMOSYNTHESIS BI: CPT | Mod: 26

## 2023-10-02 PROCEDURE — 77063 BREAST TOMOSYNTHESIS BI: CPT

## 2023-10-02 PROCEDURE — 77067 SCR MAMMO BI INCL CAD: CPT

## 2023-10-02 PROCEDURE — 77067 SCR MAMMO BI INCL CAD: CPT | Mod: 26

## 2023-10-02 NOTE — ED ADULT NURSE NOTE - MUSCULOSKELETAL ASSESSMENT
WDL Acitretin Pregnancy And Lactation Text: This medication is Pregnancy Category X and should not be given to women who are pregnant or may become pregnant in the future. This medication is excreted in breast milk.

## 2023-10-04 ENCOUNTER — APPOINTMENT (OUTPATIENT)
Dept: ULTRASOUND IMAGING | Facility: IMAGING CENTER | Age: 48
End: 2023-10-04
Payer: COMMERCIAL

## 2023-10-04 ENCOUNTER — OUTPATIENT (OUTPATIENT)
Dept: OUTPATIENT SERVICES | Facility: HOSPITAL | Age: 48
LOS: 1 days | End: 2023-10-04
Payer: COMMERCIAL

## 2023-10-04 DIAGNOSIS — Z98.890 OTHER SPECIFIED POSTPROCEDURAL STATES: Chronic | ICD-10-CM

## 2023-10-04 DIAGNOSIS — Z00.8 ENCOUNTER FOR OTHER GENERAL EXAMINATION: ICD-10-CM

## 2023-10-04 DIAGNOSIS — R22.9 LOCALIZED SWELLING, MASS AND LUMP, UNSPECIFIED: Chronic | ICD-10-CM

## 2023-10-04 PROCEDURE — 76641 ULTRASOUND BREAST COMPLETE: CPT | Mod: 26,50

## 2023-10-04 PROCEDURE — 76641 ULTRASOUND BREAST COMPLETE: CPT

## 2024-10-07 ENCOUNTER — APPOINTMENT (OUTPATIENT)
Dept: MAMMOGRAPHY | Facility: IMAGING CENTER | Age: 49
End: 2024-10-07
Payer: COMMERCIAL

## 2024-10-07 ENCOUNTER — APPOINTMENT (OUTPATIENT)
Dept: ULTRASOUND IMAGING | Facility: IMAGING CENTER | Age: 49
End: 2024-10-07
Payer: COMMERCIAL

## 2024-10-07 ENCOUNTER — OUTPATIENT (OUTPATIENT)
Dept: OUTPATIENT SERVICES | Facility: HOSPITAL | Age: 49
LOS: 1 days | End: 2024-10-07
Payer: COMMERCIAL

## 2024-10-07 DIAGNOSIS — Z98.890 OTHER SPECIFIED POSTPROCEDURAL STATES: Chronic | ICD-10-CM

## 2024-10-07 DIAGNOSIS — Z00.8 ENCOUNTER FOR OTHER GENERAL EXAMINATION: ICD-10-CM

## 2024-10-07 DIAGNOSIS — R22.9 LOCALIZED SWELLING, MASS AND LUMP, UNSPECIFIED: Chronic | ICD-10-CM

## 2024-10-07 PROCEDURE — 76641 ULTRASOUND BREAST COMPLETE: CPT

## 2024-10-07 PROCEDURE — 77063 BREAST TOMOSYNTHESIS BI: CPT | Mod: 26

## 2024-10-07 PROCEDURE — 77067 SCR MAMMO BI INCL CAD: CPT | Mod: 26

## 2024-10-07 PROCEDURE — 76641 ULTRASOUND BREAST COMPLETE: CPT | Mod: 26,50

## 2024-10-07 PROCEDURE — 77063 BREAST TOMOSYNTHESIS BI: CPT

## 2024-10-07 PROCEDURE — 77067 SCR MAMMO BI INCL CAD: CPT

## 2024-10-25 ENCOUNTER — APPOINTMENT (OUTPATIENT)
Age: 49
End: 2024-10-25
Payer: COMMERCIAL

## 2024-10-25 ENCOUNTER — NON-APPOINTMENT (OUTPATIENT)
Age: 49
End: 2024-10-25

## 2024-10-25 PROCEDURE — 92004 COMPRE OPH EXAM NEW PT 1/>: CPT

## 2025-08-11 ENCOUNTER — APPOINTMENT (OUTPATIENT)
Age: 50
End: 2025-08-11
Payer: COMMERCIAL

## 2025-08-11 ENCOUNTER — NON-APPOINTMENT (OUTPATIENT)
Age: 50
End: 2025-08-11

## 2025-08-11 PROCEDURE — 92014 COMPRE OPH EXAM EST PT 1/>: CPT

## (undated) DEVICE — ELCTR ECG CONDUCTIVE ADHESIVE

## (undated) DEVICE — FACESHIELD FULL VISOR

## (undated) DEVICE — DRSG BANDAID 0.75X3"

## (undated) DEVICE — DRSG CURITY GAUZE SPONGE 4 X 4" 12-PLY NON-STERILE

## (undated) DEVICE — TUBING IV SET GRAVITY 3Y 100" MACRO

## (undated) DEVICE — CATH IV SAFE BC 22G X 1" (BLUE)

## (undated) DEVICE — PACK IV START WITH CHG

## (undated) DEVICE — BIOPSY FORCEP COLD DISP

## (undated) DEVICE — TUBING MEDI-VAC W MAXIGRIP CONNECTORS 1/4"X6'

## (undated) DEVICE — CONTAINER FORMALIN 80ML YELLOW

## (undated) DEVICE — GOWN LG

## (undated) DEVICE — TUBING SUCTION NONCONDUCTIVE 6MM X 12FT

## (undated) DEVICE — SALIVA EJECTOR (BLUE)

## (undated) DEVICE — LUBRICATING JELLY HR ONE SHOT 3G

## (undated) DEVICE — DRSG 2X2

## (undated) DEVICE — ELCTR GROUNDING PAD ADULT COVIDIEN

## (undated) DEVICE — BIOPSY FORCEP RADIAL JAW 4 STANDARD WITH NEEDLE

## (undated) DEVICE — LINE BREATHE SAMPLNG

## (undated) DEVICE — BASIN EMESIS 10IN GRADUATED MAUVE